# Patient Record
Sex: MALE | HISPANIC OR LATINO | Employment: FULL TIME | URBAN - METROPOLITAN AREA
[De-identification: names, ages, dates, MRNs, and addresses within clinical notes are randomized per-mention and may not be internally consistent; named-entity substitution may affect disease eponyms.]

---

## 2021-06-19 PROCEDURE — U0003 INFECTIOUS AGENT DETECTION BY NUCLEIC ACID (DNA OR RNA); SEVERE ACUTE RESPIRATORY SYNDROME CORONAVIRUS 2 (SARS-COV-2) (CORONAVIRUS DISEASE [COVID-19]), AMPLIFIED PROBE TECHNIQUE, MAKING USE OF HIGH THROUGHPUT TECHNOLOGIES AS DESCRIBED BY CMS-2020-01-R: HCPCS | Performed by: FAMILY MEDICINE

## 2021-06-20 ENCOUNTER — TELEPHONE (OUTPATIENT)
Dept: URGENT CARE | Facility: CLINIC | Age: 36
End: 2021-06-20

## 2021-06-20 NOTE — TELEPHONE ENCOUNTER
----- Message from Jess Potter PA-C sent at 6/20/2021  2:12 PM EDT -----  Please call patient to notify of negative result.     Return to Work Practices and Work Restrictions:     If symptoms are still present continue quarantine for 10 days from the time of symptom onset AND at least 24 hours have passed since last fever above 100.0 without using fever reducing medication and symptoms of cough, shortness of breath, and fever greater than 100 have improved.    Loss of taste and smell may persist for weeks or months after recovery and need not delay the end of isolation.    If you are asymptomatic throughout your infection you may return to work/school when at least 10 days have passed since the date of your first positive test.  Patients who are immunocompromised and were asymptomatic may return to work/school after 20 days have passed since the date of their first positive test.    Wear a facemask at all times until all symptoms are completely resolved or until 14 days after illness onset, which ever is longer.    Adhere to hand hygiene, respiratory hygiene and cough etiquette, cover your nose and mouth when coughing or sneezing, dispose of tissues and waste receptacles, wash your hands.    Self monitor symptoms and seek reevaluation from your physician or advanced practice clinician if respiratory symptoms recur or worsen.    Spoke to pt he states that he is feeling better. He had no further questions for me at this time.